# Patient Record
Sex: MALE | Race: WHITE | NOT HISPANIC OR LATINO | Employment: FULL TIME | URBAN - NONMETROPOLITAN AREA
[De-identification: names, ages, dates, MRNs, and addresses within clinical notes are randomized per-mention and may not be internally consistent; named-entity substitution may affect disease eponyms.]

---

## 2020-09-08 ENCOUNTER — APPOINTMENT (OUTPATIENT)
Dept: LAB | Facility: HOSPITAL | Age: 20
End: 2020-09-08
Attending: PHYSICIAN ASSISTANT
Payer: COMMERCIAL

## 2020-09-08 DIAGNOSIS — Z20.822 CLOSE EXPOSURE TO 2019 NOVEL CORONAVIRUS: ICD-10-CM

## 2020-09-08 DIAGNOSIS — R05.9 COUGH: ICD-10-CM

## 2020-09-08 PROCEDURE — U0003 INFECTIOUS AGENT DETECTION BY NUCLEIC ACID (DNA OR RNA); SEVERE ACUTE RESPIRATORY SYNDROME CORONAVIRUS 2 (SARS-COV-2) (CORONAVIRUS DISEASE [COVID-19]), AMPLIFIED PROBE TECHNIQUE, MAKING USE OF HIGH THROUGHPUT TECHNOLOGIES AS DESCRIBED BY CMS-2020-01-R: HCPCS

## 2020-09-09 LAB — SARS-COV-2 RNA RESP QL NAA+PROBE: NOT DETECTED

## 2021-03-31 PROBLEM — Z00.00 ROUTINE GENERAL MEDICAL EXAMINATION AT A HEALTH CARE FACILITY: Status: ACTIVE | Noted: 2021-03-31

## 2021-03-31 PROBLEM — R79.89 LOW TESTOSTERONE: Status: ACTIVE | Noted: 2021-03-31

## 2021-03-31 PROBLEM — F32.9 MDD (MAJOR DEPRESSIVE DISORDER): Status: ACTIVE | Noted: 2021-03-31

## 2021-04-29 PROBLEM — N50.819 TESTICULAR PAIN: Status: ACTIVE | Noted: 2021-04-29

## 2021-07-05 PROBLEM — Z00.00 ROUTINE GENERAL MEDICAL EXAMINATION AT A HEALTH CARE FACILITY: Status: RESOLVED | Noted: 2021-03-31 | Resolved: 2021-07-05

## 2024-01-26 ENCOUNTER — LAB VISIT (OUTPATIENT)
Dept: LAB | Facility: HOSPITAL | Age: 24
End: 2024-01-26
Attending: FAMILY MEDICINE
Payer: COMMERCIAL

## 2024-01-26 DIAGNOSIS — Z13.6 SCREENING FOR CARDIOVASCULAR CONDITION: ICD-10-CM

## 2024-01-26 DIAGNOSIS — R79.89 LOW TESTOSTERONE: ICD-10-CM

## 2024-01-26 DIAGNOSIS — Z13.1 SCREENING FOR DIABETES MELLITUS: ICD-10-CM

## 2024-01-26 DIAGNOSIS — Z13.220 SCREENING FOR LIPOID DISORDERS: ICD-10-CM

## 2024-01-26 DIAGNOSIS — Z13.21 ENCOUNTER FOR VITAMIN DEFICIENCY SCREENING: ICD-10-CM

## 2024-01-26 DIAGNOSIS — Z79.899 ENCOUNTER FOR LONG-TERM (CURRENT) USE OF OTHER MEDICATIONS: ICD-10-CM

## 2024-01-26 DIAGNOSIS — Z13.29 SCREENING FOR THYROID DISORDER: ICD-10-CM

## 2024-01-26 DIAGNOSIS — Z00.00 WELLNESS EXAMINATION: ICD-10-CM

## 2024-01-26 LAB
ALBUMIN SERPL BCP-MCNC: 4 G/DL (ref 3.5–5.2)
ALBUMIN/CREAT UR: 4.3 UG/MG (ref 0–30)
ALP SERPL-CCNC: 93 U/L (ref 55–135)
ALT SERPL W/O P-5'-P-CCNC: 18 U/L (ref 10–44)
ANION GAP SERPL CALC-SCNC: 4 MMOL/L (ref 3–11)
AST SERPL-CCNC: 14 U/L (ref 10–40)
BASOPHILS # BLD AUTO: 0.02 K/UL (ref 0–0.2)
BASOPHILS NFR BLD: 0.3 % (ref 0–1.9)
BILIRUB SERPL-MCNC: 0.9 MG/DL (ref 0.1–1)
BUN SERPL-MCNC: 14 MG/DL (ref 6–20)
CALCIUM SERPL-MCNC: 8.6 MG/DL (ref 8.7–10.5)
CHLORIDE SERPL-SCNC: 109 MMOL/L (ref 95–110)
CHOLEST SERPL-MCNC: 97 MG/DL (ref 120–199)
CHOLEST/HDLC SERPL: 1.9 {RATIO} (ref 2–5)
CO2 SERPL-SCNC: 28 MMOL/L (ref 23–29)
CREAT SERPL-MCNC: 1.1 MG/DL (ref 0.5–1.4)
CREAT UR-MCNC: 485 MG/DL (ref 23–375)
DIFFERENTIAL METHOD BLD: NORMAL
EOSINOPHIL # BLD AUTO: 0.1 K/UL (ref 0–0.5)
EOSINOPHIL NFR BLD: 0.8 % (ref 0–8)
ERYTHROCYTE [DISTWIDTH] IN BLOOD BY AUTOMATED COUNT: 12.1 % (ref 11.5–14.5)
EST. GFR  (NO RACE VARIABLE): >60 ML/MIN/1.73 M^2
ESTIMATED AVG GLUCOSE: 94 MG/DL (ref 68–131)
GLUCOSE SERPL-MCNC: 86 MG/DL (ref 70–110)
HBA1C MFR BLD: 4.9 % (ref 4–5.6)
HCT VFR BLD AUTO: 43.1 % (ref 40–54)
HDLC SERPL-MCNC: 52 MG/DL (ref 40–75)
HDLC SERPL: 53.6 % (ref 20–50)
HGB BLD-MCNC: 15.1 G/DL (ref 14–18)
IMM GRANULOCYTES # BLD AUTO: 0.02 K/UL (ref 0–0.04)
IMM GRANULOCYTES NFR BLD AUTO: 0.3 % (ref 0–0.5)
LDLC SERPL CALC-MCNC: 27.6 MG/DL (ref 63–159)
LYMPHOCYTES # BLD AUTO: 2.3 K/UL (ref 1–4.8)
LYMPHOCYTES NFR BLD: 39.5 % (ref 18–48)
MCH RBC QN AUTO: 29.6 PG (ref 27–31)
MCHC RBC AUTO-ENTMCNC: 35 G/DL (ref 32–36)
MCV RBC AUTO: 85 FL (ref 82–98)
MICROALBUMIN UR DL<=1MG/L-MCNC: 21 MG/L
MONOCYTES # BLD AUTO: 0.4 K/UL (ref 0.3–1)
MONOCYTES NFR BLD: 6.4 % (ref 4–15)
NEUTROPHILS # BLD AUTO: 3.1 K/UL (ref 1.8–7.7)
NEUTROPHILS NFR BLD: 52.7 % (ref 38–73)
NONHDLC SERPL-MCNC: 45 MG/DL
NRBC BLD-RTO: 0 /100 WBC
PLATELET # BLD AUTO: 235 K/UL (ref 150–450)
PMV BLD AUTO: 10.4 FL (ref 9.2–12.9)
POTASSIUM SERPL-SCNC: 3.6 MMOL/L (ref 3.5–5.1)
PROT SERPL-MCNC: 7.1 G/DL (ref 6–8.4)
RBC # BLD AUTO: 5.1 M/UL (ref 4.6–6.2)
SODIUM SERPL-SCNC: 141 MMOL/L (ref 136–145)
T4 FREE SERPL-MCNC: 1.08 NG/DL (ref 0.71–1.51)
TESTOST SERPL-MCNC: 628 NG/DL (ref 304–1227)
TRIGL SERPL-MCNC: 87 MG/DL (ref 30–150)
TSH SERPL DL<=0.005 MIU/L-ACNC: 0.91 UIU/ML (ref 0.4–4)
WBC # BLD AUTO: 5.93 K/UL (ref 3.9–12.7)

## 2024-01-26 PROCEDURE — 83036 HEMOGLOBIN GLYCOSYLATED A1C: CPT | Performed by: FAMILY MEDICINE

## 2024-01-26 PROCEDURE — 80053 COMPREHEN METABOLIC PANEL: CPT | Performed by: FAMILY MEDICINE

## 2024-01-26 PROCEDURE — 85025 COMPLETE CBC W/AUTO DIFF WBC: CPT | Performed by: FAMILY MEDICINE

## 2024-01-26 PROCEDURE — 82043 UR ALBUMIN QUANTITATIVE: CPT | Performed by: FAMILY MEDICINE

## 2024-01-26 PROCEDURE — 84439 ASSAY OF FREE THYROXINE: CPT | Performed by: FAMILY MEDICINE

## 2024-01-26 PROCEDURE — 84443 ASSAY THYROID STIM HORMONE: CPT | Performed by: FAMILY MEDICINE

## 2024-01-26 PROCEDURE — 84402 ASSAY OF FREE TESTOSTERONE: CPT | Performed by: FAMILY MEDICINE

## 2024-01-26 PROCEDURE — 80061 LIPID PANEL: CPT | Performed by: FAMILY MEDICINE

## 2024-01-26 PROCEDURE — 84403 ASSAY OF TOTAL TESTOSTERONE: CPT | Performed by: FAMILY MEDICINE

## 2024-01-26 PROCEDURE — 36415 COLL VENOUS BLD VENIPUNCTURE: CPT | Performed by: FAMILY MEDICINE

## 2024-01-29 LAB — TESTOST FREE SERPL-MCNC: 22 PG/ML (ref 5.1–41.5)

## 2024-04-22 PROBLEM — Z00.00 WELLNESS EXAMINATION: Status: RESOLVED | Noted: 2021-03-31 | Resolved: 2024-04-22

## 2025-07-14 NOTE — PROGRESS NOTES
Subjective     Patient ID: Ernesto Shaffer is a 24 y.o. male.    Chief Complaint: Hypogonadism        Ernesto Shaffer presents to the clinic today to establish care for presumed hypogonadism. His symptoms started at around age of 18. His initial symptom was ED; which was followed by fatigue, low libido, low mood, irritability. He has low energy chronically and gets even more exhausted with work-outs. He reports brain fogginess.    Over the course of years he had multiple doctor visits for the evaluation of possible hypogonadism. His serum testosterone levels were checked over the last six years, which all resulted normal. However he thinks they may be low for his age group.  He was seen by Dr. Jean in 2020 due to same problem. Then his bloodwrok revealed testosterone, FSH, LH, prolactin all WNL. At that time he was reporting loss of smell in addition to the sexual symptoms. She ordered an extensive w/u for the etiology of possible hypogonadism, which includes: 17OHP, DHEAS, androstenedione (for NCCAH); Utox and UDS (for testosterone and anabolics), brain MRI, pituitary labs, iron panel, estrodiol; which all resulted normal. Dr. Jean had mentioned she would consider checking for genetic mutations for possible Dorohty's Syndrome should these work-up resulted normal,but it is unclear if the patient followed up after the results.    Patient reports that 2 years ago he was evaluated by a holistic doctor who checked him for heavy metals and the results came back at 99th percentile for arsenic, lead, mercury. He was given DMSA p.o. for 6 months for cleanse. Subsequent results came back wnl. He felt better for a short while with the treatment.     When he starts a new romantic relationship he struggles at first, but then his mood improves and sexual symptoms improve. That improvement usually lasts for 2 months before they relapse.  For depression he used to be on Bupropion which he felt better on (mood-wise) but many  symptoms continued. He tried Rameron for a short while but had to stop due to weight gain.    He reports a achy pain of his right testis and painful (penis) erection.    Additionally he thinks his testes and penis size shrunk over time. He thinks that his bears is growing slowly since a while ago. He denies any change in body hair distribution. Reports still having sporadic morning erections. Denies any muscle atrophy, gynecomastia      Review of Systems   Constitutional:  Positive for fatigue. Negative for chills and fever.   Respiratory:  Negative for shortness of breath.    Cardiovascular:  Negative for chest pain.   Gastrointestinal:  Negative for abdominal pain and nausea.   Endocrine: Negative for polydipsia and polyuria.   Genitourinary:  Positive for erectile dysfunction, penile pain and testicular pain.   Integumentary:  Negative for rash and breast mass.   Neurological:  Negative for dizziness and headaches.   Psychiatric/Behavioral:  Positive for confusion, decreased concentration, depressed mood and dysphoric mood.    Breast: Negative for mass         Objective     Physical Exam  Constitutional:       General: He is not in acute distress.  HENT:      Right Ear: External ear normal.      Left Ear: External ear normal.      Mouth/Throat:      Mouth: Mucous membranes are moist.      Pharynx: Oropharynx is clear.   Eyes:      Extraocular Movements: Extraocular movements intact.   Cardiovascular:      Rate and Rhythm: Normal rate and regular rhythm.   Pulmonary:      Effort: No respiratory distress.      Breath sounds: Normal breath sounds.   Abdominal:      General: There is no distension.      Tenderness: There is no abdominal tenderness.   Genitourinary:     Penis: Normal.       Testes: Normal.   Musculoskeletal:      Right lower leg: No edema.      Left lower leg: No edema.   Skin:     General: Skin is warm.      Capillary Refill: Capillary refill takes less than 2 seconds.      Coloration: Skin is not  jaundiced.   Neurological:      Mental Status: He is alert and oriented to person, place, and time.   Psychiatric:         Mood and Affect: Mood is depressed.         Labs  Lab Results   Component Value Date    WBC 10.4 02/04/2025    HGB 15.8 02/04/2025    HCT 47.0 02/04/2025    MCV 87 02/04/2025     02/04/2025   CMP   Latest Reference Range & Units 02/04/25 08:10   Sodium 134 - 144 mmol/L 140   Potassium 3.5 - 5.2 mmol/L 4.0   Chloride 96 - 106 mmol/L 100   CO2 20 - 29 mmol/L 27   BUN 6 - 20 mg/dL 14   Creatinine 0.76 - 1.27 mg/dL 1.19   BUN/CREAT RATIO 9 - 20  12   eGFR >59 mL/min/1.73 87   Glucose 70 - 99 mg/dL 84   Calcium 8.7 - 10.2 mg/dL 9.9   ALP 44 - 121 IU/L 94   PROTEIN TOTAL 6.0 - 8.5 g/dL 7.7   Albumin 4.3 - 5.2 g/dL 4.9   BILIRUBIN TOTAL 0.0 - 1.2 mg/dL 0.5   AST 0 - 40 IU/L 18   ALT 0 - 44 IU/L 11   Globulin, Total 1.5 - 4.5 g/dL 2.8      Latest Reference Range & Units 02/04/25 08:10   Cholesterol Total 100 - 199 mg/dL 145   HDL >39 mg/dL 48   LDL Calculated 0 - 99 mg/dL 78   Triglycerides 0 - 149 mg/dL 101   VLDL Cholesterol Sylvester 5 - 40 mg/dL 19      Latest Reference Range & Units 01/26/24 08:15 02/04/25 08:10   Hemoglobin A1C External 4.8 - 5.6 % 4.9 5.4   Estimated Avg Glucose 68 - 131 mg/dL 94    TSH 0.450 - 4.500 uIU/mL 0.907 1.520   Free T4 0.71 - 1.51 ng/dL 1.08       Latest Reference Range & Units 12/07/19 09:24 01/08/20 09:13 02/19/20 10:40 04/01/21 09:51 01/26/24 08:15 02/04/25 08:10   FSH 0.95 - 11.95 mIU/mL  1.20       Luteinizing Hormone 0.6 - 12.1 mIU/mL  3.1       Prolactin 3.5 - 19.4 ng/mL  8.4       Sex Hormone Binding Globulin 11 - 56 nmol/L  18       Testosterone Total 264 - 916 ng/dL    406  395   Testosterone, Free 9.3 - 26.5 pg/mL      14.3   Testosterone, Free 5.1 - 41.5 pg/mL  13.8   22.0    Testosterone, Total 304 - 1227 ng/dL 388 380 461  628      EXAMINATION:  MRI BRAIN W WO CONTRAST  CLINICAL HISTORY:  Hypopituitarism hypogonoadotropic   "hypogonadism;  FINDINGS:  Pituitary gland is normal in size.  Craniocaudal length of the pituitary is approximately 5 mm and AP diameter 1 cm.  Pre contrast images demonstrate normal posterior pituitary T1 bright signal.  There is homogeneous enhancement of the gland after contrast administration with no definite mass identified.  Appropriate enhancement of the pituitary stalk without deviation.  Normal appearance of the optic chiasm and remaining midline structures.     Ventricles and basal cisterns are normal in appearance.  Cerebral and cerebellar parenchymal signal appear well maintained.  Major intracranial flow voids are intact.     Impression:     No significant abnormality detected.             Assessment and Plan     1. Episode of recurrent major depressive disorder, unspecified depression episode severity  Assessment & Plan:  He presents with low libido and ED. This is accompanied by fatigue, decreased concentration, low mood. He is stil having the morning erections, and the body hair distribution is normal. His symptoms improve when he is in earlier stages of a romantiuc relationship. Bloodwork repetatively showed normal levels of testosterone, ruling out hypogonadism.  Patient was suspecting that even though his testosterone levels were marked as normal, they still can be low for a young male at his age.  [facility] noted that the reference range would be 264-916 ng/dL and explained it as:  "Comment: Adult male reference interval is based on a population of healthy nonobese males (BMI <30) between 19 and 39 years old. Víctor et.al. JCEM 2017,102;1193-8868. PMID: 97449238. ". I explained him and showed him this comment that his levels would be considered perfectly normal for a male at his age. He was understanding.  Additionally per my exam I noted normal body hair distribuiton and normal testicle and penis sizes. Given that hypogonadism has been ruled out, and the pattern of symptom fluctuation overlaps " with mood levels I suspect his symptoms are 2/2 untreated depression.  - Referral for Psychology was given for counseling.   - Suggested to discuss with his PCP to further augmenting the treatment for MDD with oral antidepressants  - No need to continue following up with Endocrinology at this time.        Orders:  -     Ambulatory referral/consult to Psychology; Future; Expected date: 07/22/2025    2. Pain in right testicle  Assessment & Plan:  Suspect that may be secondary to varicocele. Reports additional penile pain with erections. Physical exam was unremarkable.   - Recommended to follow-up with Urology      3. Erectile dysfunction, unspecified erectile dysfunction type  Assessment & Plan:  Secondary to depression. Hypogonadism has been ruled out. No need for firther f/u or w/u from Endocrine standpoint.  Refer to [facility] for detailed A&P.               No follow-ups on file.        Nick Martinez MD  Endocrinology fellow, PGY-4.

## 2025-07-15 ENCOUNTER — OFFICE VISIT (OUTPATIENT)
Dept: ENDOCRINOLOGY | Facility: CLINIC | Age: 25
End: 2025-07-15
Payer: COMMERCIAL

## 2025-07-15 VITALS
HEIGHT: 66 IN | HEART RATE: 101 BPM | WEIGHT: 159.31 LBS | DIASTOLIC BLOOD PRESSURE: 79 MMHG | BODY MASS INDEX: 25.6 KG/M2 | SYSTOLIC BLOOD PRESSURE: 121 MMHG

## 2025-07-15 DIAGNOSIS — F33.9 EPISODE OF RECURRENT MAJOR DEPRESSIVE DISORDER, UNSPECIFIED DEPRESSION EPISODE SEVERITY: Primary | ICD-10-CM

## 2025-07-15 DIAGNOSIS — N50.811 PAIN IN RIGHT TESTICLE: ICD-10-CM

## 2025-07-15 DIAGNOSIS — N52.9 ERECTILE DYSFUNCTION, UNSPECIFIED ERECTILE DYSFUNCTION TYPE: ICD-10-CM

## 2025-07-15 PROBLEM — R79.89 LOW TESTOSTERONE: Status: RESOLVED | Noted: 2021-03-31 | Resolved: 2025-07-15

## 2025-07-15 PROCEDURE — 3074F SYST BP LT 130 MM HG: CPT | Mod: CPTII,S$GLB,, | Performed by: INTERNAL MEDICINE

## 2025-07-15 PROCEDURE — 3044F HG A1C LEVEL LT 7.0%: CPT | Mod: CPTII,S$GLB,, | Performed by: INTERNAL MEDICINE

## 2025-07-15 PROCEDURE — 99204 OFFICE O/P NEW MOD 45 MIN: CPT | Mod: S$GLB,,, | Performed by: INTERNAL MEDICINE

## 2025-07-15 PROCEDURE — 1159F MED LIST DOCD IN RCRD: CPT | Mod: CPTII,S$GLB,, | Performed by: INTERNAL MEDICINE

## 2025-07-15 PROCEDURE — 3078F DIAST BP <80 MM HG: CPT | Mod: CPTII,S$GLB,, | Performed by: INTERNAL MEDICINE

## 2025-07-15 PROCEDURE — 3008F BODY MASS INDEX DOCD: CPT | Mod: CPTII,S$GLB,, | Performed by: INTERNAL MEDICINE

## 2025-07-15 PROCEDURE — 1160F RVW MEDS BY RX/DR IN RCRD: CPT | Mod: CPTII,S$GLB,, | Performed by: INTERNAL MEDICINE

## 2025-07-15 PROCEDURE — 99999 PR PBB SHADOW E&M-EST. PATIENT-LVL III: CPT | Mod: PBBFAC,,, | Performed by: INTERNAL MEDICINE

## 2025-07-15 NOTE — ASSESSMENT & PLAN NOTE
"He presents with low libido and ED. This is accompanied by fatigue, decreased concentration, low mood. He is stil having the morning erections, and the body hair distribution is normal. His symptoms improve when he is in earlier stages of a romantiuc relationship. Bloodwork repetatively showed normal levels of testosterone, ruling out hypogonadism.  Patient was suspecting that even though his testosterone levels were marked as normal, they still can be low for a young male at his age.  [facility] noted that the reference range would be 264-916 ng/dL and explained it as:  "Comment: Adult male reference interval is based on a population of healthy nonobese males (BMI <30) between 19 and 39 years old. Víctor et.al. JCEM 2017,102;2321-2203. PMID: [id number]. ". I explained him and showed him this comment that his levels would be considered perfectly normal for a male at his age. He was understanding.  Additionally per my exam I noted normal body hair distribuiton and normal testicle and penis sizes. Given that hypogonadism has been ruled out, and the pattern of symptom fluctuation overlaps with mood levels I suspect his symptoms are 2/2 untreated depression.  - Referral for Psychology was given for counseling.   - Suggested to discuss with his PCP to further augmenting the treatment for MDD with oral antidepressants  - No need to continue following up with Endocrinology at this time.      "

## 2025-07-15 NOTE — ASSESSMENT & PLAN NOTE
Secondary to depression. Hypogonadism has been ruled out. No need for firther f/u or w/u from Endocrine standpoint.  Refer to [facility] for detailed A&P.

## 2025-07-15 NOTE — ASSESSMENT & PLAN NOTE
Suspect that may be secondary to varicocele. Reports additional penile pain with erections. Physical exam was unremarkable.   - Recommended to follow-up with Urology